# Patient Record
Sex: MALE | URBAN - METROPOLITAN AREA
[De-identification: names, ages, dates, MRNs, and addresses within clinical notes are randomized per-mention and may not be internally consistent; named-entity substitution may affect disease eponyms.]

---
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
## 2022-04-11 ENCOUNTER — HISTORICAL (OUTPATIENT)
Dept: ADMINISTRATIVE | Facility: HOSPITAL | Age: 22
End: 2022-04-11

## 2022-04-24 VITALS
SYSTOLIC BLOOD PRESSURE: 123 MMHG | BODY MASS INDEX: 23.12 KG/M2 | DIASTOLIC BLOOD PRESSURE: 85 MMHG | HEIGHT: 70 IN | OXYGEN SATURATION: 99 % | WEIGHT: 161.5 LBS

## 2022-04-30 NOTE — OP NOTE
DATE OF SURGERY:    06/22/2018    SURGEON:  Loc Torres MD    PREOPERATIVE DIAGNOSIS:  Anterior midline neck mass.    POSTOPERATIVE DIAGNOSIS:  Anterior midline neck mass.    PROCEDURE:  Excision of anterior midline neck mass (Sistrunk procedure).    ASSISTANT:  Isha Torres    INDICATIONS:  The patient is an 18-year-old gentleman who was referred to me for evaluation of an anterior neck mass.  It was present just to the right of midline, above his thyroid notch, over his hyoid bone.  This mass elevated with swallowing, and it appeared after he had an upper respiratory infection; all of which were consistent with a thyroglossal duct cyst.  A CT scan demonstrated findings consistent with a thyroglossal duct cyst.  We discussed the aforementioned procedure in detail, and his written and verbal informed consents were obtained prior to the procedure.    PROCEDURE IN DETAIL:  The patient was taken to the operating room and placed in the supine position on the operating room table.  General endotracheal anesthesia was administered without any complications, and appropriate eye protection and head drape were placed.  The patient was prepped and draped in the usual sterile fashion and placed into extension with appropriate padding for the head and extremities.  The mass was identified just to the right of midline over his hyoid bone, and a transverse incision was marked out over the hyoid bone approximately 4 cm in length.  The skin was infiltrated in this area with 2 mL of 1% lidocaine with epinephrine.  A skin incision was made using a 15-blade and carried through the platysma using the Bovie electrocautery.  Small subplatysmal flaps were raised superiorly and inferiorly, and then the skin flaps were retracted using [place] Star retractor hooks.  We identified the cystic mass just to the right of midline and carefully dissected around the mass circumferentially, dividing muscular attachments using the  Bovie electrocautery.  We then used the Bovie electrocautery to identify the median raphe and retract the sternohyoid muscles laterally.  We identified the inferior border of the hyoid.  The infrahyoid musculature was skeletonized off the hyoid, exposing the periosteum, and then a Toombs elevator was used to raise the periosteum over the deep side of the hyoid bone.  We dissected the suprahyoid musculature as well, taking care to remain superficial and not enter the tract.  We did identify the pyramidal lobe of the thyroid which was divided low and tied off with a 2-0 silk suture.  Curved Martinez scissors were used to make our bony cuts on either side of the central third of the hyoid bone.  This significantly freed up our specimen.  We then dissected out the tract which was very prominent and larger than expected leading toward the tongue base.  We dissected the tract all the way up to the tongue base, and then cut and suture ligated the tract using a 2-0 silk suture.       The specimen was passed off the field, and the wound was irrigated with normal saline, suctioned out and reinspected, and several areas of residual bleeding were addressed using bipolar electrocautery.  A stab incision was made inferior to the main incision, and a 7-Japanese LESLEE drain was placed into the wound bed and secured to the skin using a 2-0 silk suture.  The strap muscles and the platysma were closed in 2 layers using deep interrupted 4-0 Vicryl sutures.  Prior to this, several pieces of Surgicel packing were placed in the wound bed.  The subcutaneous tissues were closed using a layer of deep interrupted 4-0 Vicryl sutures, and the skin was closed using a running subcuticular 4-0 Monocryl suture.  Mastisol and Steri-Strips were applied and a small pressure dressing.  The patient awoke from general endotracheal anesthesia without any complications and was transferred to the recovery room in satisfactory condition.    ESTIMATED BLOOD LOSS:   Minimal.    COMPLICATIONS:  None.    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    DRAINS AND PACKS:  A 7-Korean round LESLEE drain.    SPECIMEN:  Midline neck mass.        ______________________________  MD KAMALA Whitlock/  DD:  06/22/2018  Time:  02:05PM  DT:  06/25/2018  Time:  09:17AM  Job #:  [id number]